# Patient Record
Sex: MALE | Race: BLACK OR AFRICAN AMERICAN
[De-identification: names, ages, dates, MRNs, and addresses within clinical notes are randomized per-mention and may not be internally consistent; named-entity substitution may affect disease eponyms.]

---

## 2019-07-04 ENCOUNTER — HOSPITAL ENCOUNTER (EMERGENCY)
Dept: HOSPITAL 91 - E/R | Age: 34
Discharge: HOME | End: 2019-07-04
Payer: MEDICAID

## 2019-07-04 ENCOUNTER — HOSPITAL ENCOUNTER (EMERGENCY)
Dept: HOSPITAL 10 - E/R | Age: 34
Discharge: HOME | End: 2019-07-04
Payer: MEDICAID

## 2019-07-04 VITALS
WEIGHT: 195.42 LBS | BODY MASS INDEX: 27.98 KG/M2 | HEIGHT: 70 IN | WEIGHT: 195.42 LBS | BODY MASS INDEX: 27.98 KG/M2 | HEIGHT: 70 IN

## 2019-07-04 VITALS — SYSTOLIC BLOOD PRESSURE: 114 MMHG | DIASTOLIC BLOOD PRESSURE: 81 MMHG | RESPIRATION RATE: 16 BRPM | HEART RATE: 54 BPM

## 2019-07-04 DIAGNOSIS — M25.561: Primary | ICD-10-CM

## 2019-07-04 PROCEDURE — 99283 EMERGENCY DEPT VISIT LOW MDM: CPT

## 2019-07-04 PROCEDURE — 73562 X-RAY EXAM OF KNEE 3: CPT

## 2019-07-04 NOTE — ERD
ER Documentation


Chief Complaint


Chief Complaint





BIB  for knee pain s/p playing basketball





HPI


34-year-old male presenting after feeling a sudden "pop" in his right knee while


playing possible.  He states he was trying to jump for a shot and that is when 


he felt it.  He denies any associated numbness or tingling in his leg.





ROS


All systems reviewed and are negative except as per history of present illness.





Medications


Home Meds


Active Scripts


Hydrocodone/Acetaminophen (Norco 5-325 Tablet) 1 Each Tablet, 1 TAB PO Q6H PRN 


for SEVERE PAIN LEVEL 7-10, #10 TAB


   Prov:PEMA COMBS MD         7/4/19





Allergies


Allergies:  


Coded Allergies:  


     No Known Allergy (Unverified , 7/4/19)





PMhx/Soc


Medical and Surgical Hx:  pt denies Medical Hx, pt denies Surgical Hx


Hx Alcohol Use:  No


Hx Substance Use:  No


Hx Tobacco Use:  No


Smoking Status:  Never smoker





FmHx


Family History:  No diabetes





Physical Exam


Vitals





Vital Signs


  Date      Temp  Pulse  Resp  B/P (MAP)   Pulse Ox  O2          O2 Flow    FiO2


Time                                                 Delivery    Rate


    7/4/19  98.0     54    16      114/81            Room Air


     21:31                           (92)


    7/4/19  98.3     72    16      121/74        98


     19:38                           (90)





Physical Exam


Const:   No acute distress


Head:   Atraumatic 


Eyes:    Normal Conjunctiva


ENT:    Normal External Ears, Nose and Mouth.


Neck:               Full range of motion. No meningismus.


Resp:   Clear to auscultation bilaterally


Cardio:   Regular rate and rhythm, no murmurs


Abd:    Soft, non tender, non distended. Normal bowel sounds


Skin:   No petechiae or rashes


Back:   No midline or flank tenderness


Ext:    Right lower extremity exam: No deformities noted.  High riding patella. 


There is a defect inferior to the patella, unable to palpate patellar tendon.  


Mild swelling around the patella.  Full passive range of motion at the knee. 


Unable to straighten the leg at the knee


Neur:   Awake and alert


Psych:    Normal Mood and Affect





Procedures/MDM


EMERGENT LABS AND DIAGNOSTIC STUDIES: 


Radiology Results as interpreted by Radiology below were reviewed by FRED Combs MD: 





XR knee: 


Patella en with mild prepatellar soft tissue thickening, which can be seen 


with patellar tendon rupture. 19 mm bony fragment identified in the mid patellar


tendon region may represent an avulsed fracture fragment. Consider MRI to 


further evaluate.


 


Mild right knee degenerative change





___________________________________________________________ 


Initial Nursing notes reviewed. 


Previous Medical Records requested via the Electronic Health Record. 





EMERGENCY DEPARTMENT COURSE / MEDICAL DECISION MAKING: 





Patient is presenting with right patella en, concerning for patellar tendon 


rupture with likely patellar fracture on x-ray.  He is neurovascularly intact.  


Patient was placed in a knee immobilizer and crutches were provided.  Follow-up 


with orthopedics was recommended within 1 week as he will  need surgery.  


Patient understands discharge plan.  Prescription for pain medications provided.


 Return precautions discussed.








Patient's blood pressure was elevated (>120/80) but appears stable without 


evidence of hypertensive emergency or urgency. The patient was counseled about 


the risks of hypertension and urged to pursue outpatient monitoring and therapy 


within a week with their primary care physician.











PEMA COMBS MD          Jul 4, 2019 20:11